# Patient Record
(demographics unavailable — no encounter records)

---

## 2024-12-17 NOTE — HISTORY OF PRESENT ILLNESS
[FreeTextEntry1] : 15 year old F with 1 year history of recurrent headaches, recently increasing in frequency to a daily headache usually occurring in the afternoon, 8/10 severity, unilateral temporal or bitemporal, neck or back of the head pressure-type pain associated with phonophobia, dizziness, lasting at least 30 min, may last several hours, intermittently relieved with sleep. No aura No reported triggers, possible trigger of not eating breakfast, gets 7 hours of sleep Headaches do not consistently respond to OTC meds, but it is unclear how often she takes medications for them No other systemic symptoms or recent illnesses  No other health issue, a good student No FH of migraine

## 2024-12-17 NOTE — ASSESSMENT
[FreeTextEntry1] : Increasing frequency and severity of migraine-like headaches (now almost daily) and with prominent neck pain Will do brain/cervical spine MRI to rule out intracranial structural/vascular pathology because the recent change in frequency and severity of the headaches Abortive headache Tx: Naproxen 500 mg q 12 hrs prn (take with food prevent gastric irritation) Side effects and potential to cause rebound headaches with frequent use (> twice a week) discussed with parent Keep HA diary to document  frequency, identify triggers and response to medication Behavioral measures to avoid headaches (maintaining regular eating and sleeping habits, avoiding known triggers) discussed with parent and patient Can try OTC Magnesium + Riboflavin supplements daily if behavioral modification does not decrease headaches Will see back in 1 month to decide on need for prescription prophylactic medications and assess effectiveness of abortive Tx (consider addition of a triptan)

## 2024-12-17 NOTE — PHYSICAL EXAM
[Well-appearing] : well-appearing [Normocephalic] : normocephalic [No dysmorphic facial features] : no dysmorphic facial features [No ocular abnormalities] : no ocular abnormalities [Neck supple] : neck supple [Lungs clear] : lungs clear [Heart sounds regular in rate and rhythm] : heart sounds regular in rate and rhythm [Soft] : soft [No organomegaly] : no organomegaly [No abnormal neurocutaneous stigmata or skin lesions] : no abnormal neurocutaneous stigmata or skin lesions [Straight] : straight [No virginia or dimples] : no virginia or dimples [No deformities] : no deformities [Alert] : alert [Well related, good eye contact] : well related, good eye contact [Conversant] : conversant [Normal speech and language] : normal speech and language [Follows instructions well] : follows instructions well [VFF] : VFF [Pupils reactive to light and accommodation] : pupils reactive to light and accommodation [Full extraocular movements] : full extraocular movements [No nystagmus] : no nystagmus [No papilledema] : no papilledema [Normal facial sensation to light touch] : normal facial sensation to light touch [No facial asymmetry or weakness] : no facial asymmetry or weakness [Gross hearing intact] : gross hearing intact [Equal palate elevation] : equal palate elevation [Good shoulder shrug] : good shoulder shrug [Normal tongue movement] : normal tongue movement [Normal axial and appendicular muscle tone] : normal axial and appendicular muscle tone [Gets up on table without difficulty] : gets up on table without difficulty [No pronator drift] : no pronator drift [Normal finger tapping and fine finger movements] : normal finger tapping and fine finger movements [No abnormal involuntary movements] : no abnormal involuntary movements [5/5 strength in proximal and distal muscles of arms and legs] : 5/5 strength in proximal and distal muscles of arms and legs [Walks and runs well] : walks and runs well [Able to do deep knee bend] : able to do deep knee bend [Able to walk on heels] : able to walk on heels [Able to walk on toes] : able to walk on toes [2+ biceps] : 2+ biceps [Knee jerks] : knee jerks [Ankle jerks] : ankle jerks [No ankle clonus] : no ankle clonus [Localizes LT and temperature] : localizes LT and temperature [No dysmetria on FTNT] : no dysmetria on FTNT [Good walking balance] : good walking balance [Normal gait] : normal gait [Able to tandem well] : able to tandem well [Negative Romberg] : negative Romberg